# Patient Record
Sex: FEMALE | Race: BLACK OR AFRICAN AMERICAN | Employment: OTHER | URBAN - METROPOLITAN AREA
[De-identification: names, ages, dates, MRNs, and addresses within clinical notes are randomized per-mention and may not be internally consistent; named-entity substitution may affect disease eponyms.]

---

## 2022-11-27 ENCOUNTER — HOSPITAL ENCOUNTER (EMERGENCY)
Age: 67
Discharge: STILL ADMITTED OR ADMINISTRATIVE | End: 2022-11-27
Attending: STUDENT IN AN ORGANIZED HEALTH CARE EDUCATION/TRAINING PROGRAM

## 2022-11-27 ENCOUNTER — HOSPITAL ENCOUNTER (EMERGENCY)
Age: 67
Discharge: HOME | End: 2022-11-27
Attending: STUDENT IN AN ORGANIZED HEALTH CARE EDUCATION/TRAINING PROGRAM
Payer: MEDICARE

## 2022-11-27 VITALS
HEART RATE: 84 BPM | DIASTOLIC BLOOD PRESSURE: 78 MMHG | TEMPERATURE: 98.5 F | WEIGHT: 190 LBS | RESPIRATION RATE: 21 BRPM | OXYGEN SATURATION: 98 % | HEIGHT: 70 IN | SYSTOLIC BLOOD PRESSURE: 153 MMHG | BODY MASS INDEX: 27.2 KG/M2

## 2022-11-27 DIAGNOSIS — R07.89 ATYPICAL CHEST PAIN: ICD-10-CM

## 2022-11-27 DIAGNOSIS — I10 HYPERTENSION, UNSPECIFIED TYPE: Primary | ICD-10-CM

## 2022-11-27 DIAGNOSIS — Z53.21 PATIENT LEFT BEFORE EVALUATION BY PHYSICIAN: Primary | ICD-10-CM

## 2022-11-27 DIAGNOSIS — F41.1 ANXIETY STATE: ICD-10-CM

## 2022-11-27 LAB
ANION GAP SERPL CALC-SCNC: 4 MMOL/L (ref 3–18)
BASOPHILS # BLD: 0.1 K/UL (ref 0–0.1)
BASOPHILS NFR BLD: 1 % (ref 0–2)
BUN SERPL-MCNC: 8 MG/DL (ref 7–18)
BUN/CREAT SERPL: 9 (ref 12–20)
CALCIUM SERPL-MCNC: 9.9 MG/DL (ref 8.5–10.1)
CHLORIDE SERPL-SCNC: 104 MMOL/L (ref 100–111)
CO2 SERPL-SCNC: 30 MMOL/L (ref 21–32)
CREAT SERPL-MCNC: 0.87 MG/DL (ref 0.6–1.3)
DIFFERENTIAL METHOD BLD: NORMAL
EOSINOPHIL # BLD: 0.2 K/UL (ref 0–0.4)
EOSINOPHIL NFR BLD: 4 % (ref 0–5)
ERYTHROCYTE [DISTWIDTH] IN BLOOD BY AUTOMATED COUNT: 12.6 % (ref 11.6–14.5)
GLUCOSE SERPL-MCNC: 148 MG/DL (ref 74–99)
HCT VFR BLD AUTO: 40.8 % (ref 35–45)
HGB BLD-MCNC: 13.8 G/DL (ref 12–16)
IMM GRANULOCYTES # BLD AUTO: 0 K/UL (ref 0–0.04)
IMM GRANULOCYTES NFR BLD AUTO: 0 % (ref 0–0.5)
LYMPHOCYTES # BLD: 2.2 K/UL (ref 0.9–3.6)
LYMPHOCYTES NFR BLD: 37 % (ref 21–52)
MCH RBC QN AUTO: 31.3 PG (ref 24–34)
MCHC RBC AUTO-ENTMCNC: 33.8 G/DL (ref 31–37)
MCV RBC AUTO: 92.5 FL (ref 78–100)
MONOCYTES # BLD: 0.3 K/UL (ref 0.05–1.2)
MONOCYTES NFR BLD: 5 % (ref 3–10)
NEUTS SEG # BLD: 3 K/UL (ref 1.8–8)
NEUTS SEG NFR BLD: 52 % (ref 40–73)
NRBC # BLD: 0 K/UL (ref 0–0.01)
NRBC BLD-RTO: 0 PER 100 WBC
PLATELET # BLD AUTO: 204 K/UL (ref 135–420)
PMV BLD AUTO: 9.5 FL (ref 9.2–11.8)
POTASSIUM SERPL-SCNC: 3.7 MMOL/L (ref 3.5–5.5)
RBC # BLD AUTO: 4.41 M/UL (ref 4.2–5.3)
SODIUM SERPL-SCNC: 138 MMOL/L (ref 136–145)
TROPONIN-HIGH SENSITIVITY: 7 NG/L (ref 0–54)
TROPONIN-HIGH SENSITIVITY: 9 NG/L (ref 0–54)
WBC # BLD AUTO: 5.8 K/UL (ref 4.6–13.2)

## 2022-11-27 PROCEDURE — 84484 ASSAY OF TROPONIN QUANT: CPT

## 2022-11-27 PROCEDURE — 99284 EMERGENCY DEPT VISIT MOD MDM: CPT

## 2022-11-27 PROCEDURE — 85025 COMPLETE CBC W/AUTO DIFF WBC: CPT

## 2022-11-27 PROCEDURE — 80048 BASIC METABOLIC PNL TOTAL CA: CPT

## 2022-11-27 RX ORDER — HYDROCHLOROTHIAZIDE 50 MG/1
25 TABLET ORAL DAILY
COMMUNITY

## 2022-11-27 NOTE — ED PROVIDER NOTES
EMERGENCY DEPARTMENT HISTORY AND PHYSICAL EXAM    Date: 11/27/2022  Patient Name: Huber Loomis    History of Presenting Illness     Chief Complaint   Patient presents with    Hypertension     HPI:  Huber Loomis is a 78 y.o. female with a history of hypertension, presented due to concern for anxiety, stress, has been looking up items on [employer] who presents with complaints of atypical right arm pain, headache (now resolved), has been having anxiety for about 5 days. Had some sweating intermittently, underarm and forehead. She has been taking ibuprofen for aches. patient with essential hypertension, she was checking her blood pressure every 15 minutes or so for today and yesterday    Patient has been taking blood pressure medications    On review of systems, the patient denies fever, chills, rashes, headache, chest pain, anuria, oliguria. PCP: Sonido Shetty MD    Current Outpatient Medications   Medication Sig Dispense Refill    hydroCHLOROthiazide (HYDRODIURIL) 50 mg tablet Take 25 mg by mouth daily. LOSARTAN PO Take  by mouth. GLIPIZIDE PO Take  by mouth. IBUPROFEN PO Take  by mouth.      sitaGLIPtin-metFORMIN (JANUMET) 50-1,000 mg per tablet Take 1 Tab by mouth two (2) times daily (with meals). lisinopril (PRINIVIL, ZESTRIL) 20 mg tablet Take  by mouth daily.          Past History     Past Medical History:  Past Medical History:   Diagnosis Date    Diabetes ([address]     Endometrial cancer ([facility] [address]     H/O colonoscopy 6/2014    H/O mammogram 6/2014    Hypertension     Obesity        Past Surgical History:  Past Surgical History:   Procedure Laterality Date    HX SALPINGO-OOPHORECTOMY Bilateral     HX TOTAL LAPAROSCOPIC HYSTERECTOMY  4/26/11       Family History:  Family History   Problem Relation Age of Onset    Cancer Sister         breast    Cancer Brother         bone       Social History:  Social History     Tobacco Use    Smoking status: Never   Substance Use Topics Alcohol use: Yes     Alcohol/week: 0.8 standard drinks     Types: 1 Glasses of wine per week    Drug use: No       Allergies:  No Known Allergies    PMH, PSH, family history, social history, allergies reviewed with the patient with significant items noted above. Review of Systems   As per HPI, otherwise all systems reviewed and negative. Physical Exam     Vitals:    11/27/22 0900 11/27/22 0908 11/27/22 1041 11/27/22 1047   BP: (!) 167/65 (!) 154/73 (!) 151/78 (!) 153/78   Pulse: 72 84     Resp: 20 21     Temp: 98.5 °F (36.9 °C)      SpO2: 100% 99% 99% 98%   Weight: 86.2 kg (190 lb)      Height: 5' 10\" (1.778 m)          Gen: Well-appearing, in no acute distress   HEENT: Normocephalic, sclera anicteric  Cardiovascular: Normal rate, regular rhythm, no murmurs, rubs, gallops. Pulses intact and equal distally. Pulmonary: No respiratory distress. No stridor. Clear lungs. ABD: Soft, nontender, nondistended. Neuro: Alert. Normal speech. Normal mentation. Psych: Normal thought content and thought processes. : No CVA tenderness  EXT: Moves all extremities well. No cyanosis or clubbing. Skin: Warm and well-perfused. Diagnostic Study Results     Labs -     Recent Results (from the past 12 hour(s))   CBC WITH AUTOMATED DIFF    Collection Time: 11/27/22  9:02 AM   Result Value Ref Range    WBC 5.8 4.6 - 13.2 K/uL    RBC 4.41 4.20 - 5.30 M/uL    HGB 13.8 12.0 - 16.0 g/dL    HCT 40.8 35.0 - 45.0 %    MCV 92.5 78.0 - 100.0 FL    MCH 31.3 24.0 - 34.0 PG    MCHC 33.8 31.0 - 37.0 g/dL    RDW 12.6 11.6 - 14.5 %    PLATELET 898 862 - 049 K/uL    MPV 9.5 9.2 - 11.8 FL    NRBC 0.0 0  WBC    ABSOLUTE NRBC 0.00 0.00 - 0.01 K/uL    NEUTROPHILS 52 40 - 73 %    LYMPHOCYTES 37 21 - 52 %    MONOCYTES 5 3 - 10 %    EOSINOPHILS 4 0 - 5 %    BASOPHILS 1 0 - 2 %    IMMATURE GRANULOCYTES 0 0.0 - 0.5 %    ABS. NEUTROPHILS 3.0 1.8 - 8.0 K/UL    ABS. LYMPHOCYTES 2.2 0.9 - 3.6 K/UL    ABS.  MONOCYTES 0.3 0.05 - 1.2 K/UL ABS. EOSINOPHILS 0.2 0.0 - 0.4 K/UL    ABS. BASOPHILS 0.1 0.0 - 0.1 K/UL    ABS. IMM. GRANS. 0.0 0.00 - 0.04 K/UL    DF AUTOMATED     METABOLIC PANEL, BASIC    Collection Time: 11/27/22  9:02 AM   Result Value Ref Range    Sodium 138 136 - 145 mmol/L    Potassium 3.7 3.5 - 5.5 mmol/L    Chloride 104 100 - 111 mmol/L    CO2 30 21 - 32 mmol/L    Anion gap 4 3.0 - 18 mmol/L    Glucose 148 (H) 74 - 99 mg/dL    BUN 8 7.0 - 18 MG/DL    Creatinine 0.87 0.6 - 1.3 MG/DL    BUN/Creatinine ratio 9 (L) 12 - 20      eGFR >60 >60 ml/min/1.73m2    Calcium 9.9 8.5 - 10.1 MG/DL   TROPONIN-HIGH SENSITIVITY    Collection Time: 11/27/22  9:02 AM   Result Value Ref Range    Troponin-High Sensitivity 7 0 - 54 ng/L   TROPONIN-HIGH SENSITIVITY    Collection Time: 11/27/22 10:40 AM   Result Value Ref Range    Troponin-High Sensitivity 9 0 - 54 ng/L       Radiologic Studies -   No orders to display     CT Results  (Last 48 hours)      None          CXR Results  (Last 48 hours)      None              Medical Decision Making   I am the first provider for this patient. I reviewed the vital signs, available nursing notes, past medical history, past surgical history, family history and social history. Vital Signs-Reviewed the patient's vital signs. Records Reviewed: Personally, on initial evaluation    MDM:   Patient presents with asymptomatic hypertension. Exam significant for normal exam.  As per ACEP recommendations, no further intervention or testing will be performed at this time, However, after discussion, patient had atypical right arm pain, some sweating. There is no indication of hypertensive emergency or hypertensive urgency.     Patient condition on initial evaluation: Stable    Plan:     Orders as below:  Orders Placed This Encounter    CBC WITH AUTOMATED DIFF    BASIC METABOLIC PANEL    TROPONIN-HIGH SENSITIVITY    TROPONIN-HIGH SENSITIVITY    EKG, 12 LEAD, INITIAL    hydroCHLOROthiazide (HYDRODIURIL) 50 mg tablet LOSARTAN PO    GLIPIZIDE PO    IBUPROFEN PO        Considered but do not suspect ACS, PE, Pneumothorax, Aortic Dissection, Myocarditis, Pericarditis, Pneumonia, Boerhaave's syndrome, Esophageal Spasm, Esophageal Reflux, PUD, MSK CP, Costochondritis, Thoracic Radiculopathy, Shingles, Precordial Catch Syndrome, Rib Fracture     Well appearing, nontoxic, concerning though for chest pain in context of risk factors/cardiac history. EKG reassuring, enzymes negative, CXR unrevealing without PTX, new infiltrate or other acute process. Doubt dissection based on history, exam, and imaging, doubt PE as well in this patient, also not hypoxic or tachycardic. I think the most prudent course in this case would be ed observation with serial enzymes and EKGs    I discussed with this the patient who agrees. ED Course:   ED Course as of 11/27/22 1125   Sun Nov 27, 2022   0934 CBC without leukocytosis or anemia. [DM]   4089 Ambulated without difficulty. [DM]   6214 No acute pathology necessitating further emergent workup or hospital admission is suspected or found. Will discharge home with follow up. She is comfortable with the plan and discharge at this time. Expressed the importance of follow up for current symptoms and she agrees and was advised on what signs/symptoms to return immediately to the ER. [DM]      ED Course User Index  [DM] Ren Zapata MD      Based on their history, EKG (which showed no evidence of ischemia or infarction) and imaging, in addition to the patient's physical exam, I see no evidence at this time for a malignant etiology for the patient's chest pain. There is no acute evidence for pulmonary embolus, acute myocardial infarction, pneumothorax, Boerhaeve syndrome, cardiac tamponade, thoracic artery dissection, or any other emergent cardiac, pulmonary or aortic pathology.            Vitals Review/addressed -     Diagnostic Study Results     Orders Placed This Encounter CBC WITH AUTOMATED DIFF     Standing Status:   Standing     Number of Occurrences:   1    BASIC METABOLIC PANEL     Standing Status:   Standing     Number of Occurrences:   1    TROPONIN-HIGH SENSITIVITY     Standing Status:   Standing     Number of Occurrences:   1    TROPONIN-HIGH SENSITIVITY     Standing Status:   Standing     Number of Occurrences:   1    EKG, 12 LEAD, INITIAL     Standing Status:   Standing     Number of Occurrences:   1     Order Specific Question:   Reason for Exam:     Answer:   Pain    hydroCHLOROthiazide (HYDRODIURIL) 50 mg tablet     Sig: Take 25 mg by mouth daily. LOSARTAN PO     Sig: Take  by mouth. GLIPIZIDE PO     Sig: Take  by mouth. IBUPROFEN PO     Sig: Take  by mouth. Labs -     Recent Results (from the past 12 hour(s))   CBC WITH AUTOMATED DIFF    Collection Time: 11/27/22  9:02 AM   Result Value Ref Range    WBC 5.8 4.6 - 13.2 K/uL    RBC 4.41 4.20 - 5.30 M/uL    HGB 13.8 12.0 - 16.0 g/dL    HCT 40.8 35.0 - 45.0 %    MCV 92.5 78.0 - 100.0 FL    MCH 31.3 24.0 - 34.0 PG    MCHC 33.8 31.0 - 37.0 g/dL    RDW 12.6 11.6 - 14.5 %    PLATELET 275 471 - 994 K/uL    MPV 9.5 9.2 - 11.8 FL    NRBC 0.0 0  WBC    ABSOLUTE NRBC 0.00 0.00 - 0.01 K/uL    NEUTROPHILS 52 40 - 73 %    LYMPHOCYTES 37 21 - 52 %    MONOCYTES 5 3 - 10 %    EOSINOPHILS 4 0 - 5 %    BASOPHILS 1 0 - 2 %    IMMATURE GRANULOCYTES 0 0.0 - 0.5 %    ABS. NEUTROPHILS 3.0 1.8 - 8.0 K/UL    ABS. LYMPHOCYTES 2.2 0.9 - 3.6 K/UL    ABS. MONOCYTES 0.3 0.05 - 1.2 K/UL    ABS. EOSINOPHILS 0.2 0.0 - 0.4 K/UL    ABS. BASOPHILS 0.1 0.0 - 0.1 K/UL    ABS. IMM.  GRANS. 0.0 0.00 - 0.04 K/UL    DF AUTOMATED     METABOLIC PANEL, BASIC    Collection Time: 11/27/22  9:02 AM   Result Value Ref Range    Sodium 138 136 - 145 mmol/L    Potassium 3.7 3.5 - 5.5 mmol/L    Chloride 104 100 - 111 mmol/L    CO2 30 21 - 32 mmol/L    Anion gap 4 3.0 - 18 mmol/L    Glucose 148 (H) 74 - 99 mg/dL    BUN 8 7.0 - 18 MG/DL    Creatinine 0. 87 0.6 - 1.3 MG/DL    BUN/Creatinine ratio 9 (L) 12 - 20      eGFR >60 >60 ml/min/1.73m2    Calcium 9.9 8.5 - 10.1 MG/DL   TROPONIN-HIGH SENSITIVITY    Collection Time: 11/27/22  9:02 AM   Result Value Ref Range    Troponin-High Sensitivity 7 0 - 54 ng/L   TROPONIN-HIGH SENSITIVITY    Collection Time: 11/27/22 10:40 AM   Result Value Ref Range    Troponin-High Sensitivity 9 0 - 54 ng/L       Radiologic Studies -   No orders to display     CT Results  (Last 48 hours)      None          CXR Results  (Last 48 hours)      None            Disposition     Disposition:  Home    CLINICAL IMPRESSION:    1. Hypertension, unspecified type    2. Anxiety state    3. Atypical chest pain        It should be noted that I will be the provider of record for this patient  Swetha Moffett MD    Follow-up Information       Follow up With Specialties Details Why [address]    THE [facility] EMERGENCY DEPT Emergency Medicine Go to  If symptoms worsen [address] [facility] [address] [facility] [address], [facility] [phone]              Current Discharge Medication List          Please note that this dictation was completed with [employer], the computer voice recognition software. Quite often unanticipated grammatical, syntax, homophones, and other interpretive errors are inadvertently transcribed by the computer software. Please disregard these errors. Please excuse any errors that have escaped final proofreading.

## 2022-11-27 NOTE — DISCHARGE INSTRUCTIONS
Please carefully read all discharge instructions  Please follow up with your primary care doctor in 1-2 days    Please follow-up with a primary care physician and if you do not have one currently use the contact information provided to obtain an appointment. If none was provided please call the number on the back of your insurance card to locate a Primary care doctor. Many offices have \"cancellation lists\" that you can ask to be placed on; should a patient with an earlier appointment cancel you will be notified to take their place. Please return to the Emergency Room immediately if your symptoms worsen. Please return to the Emergency Department if you develop a fever, chills, cannot eat or drink due to nausea or vomiting, or if any of your symptoms worsen. If you do not have insurance you can use the below for your medications. [employer][email or web address]    What are GoodRx coupons? GoodRx coupons will help you pay less than the cash price for your prescription. [employer] Pill free to use and are accepted at virtually every [place] pharmacy. Your pharmacist will know how to enter the codes on the coupon to pull up the lowest discount available. [employer] Activation    Thank you for requesting access to [employer]. Please follow the instructions below to securely access and download your online medical record. [employer] allows you to send messages to your doctor, view your test results, renew your prescriptions, schedule appointments, and more. How Do I Sign Up? In your internet browser, go to [email or web address]  Click on the First Time User? Click Here link in the Sign In box. You will be redirect to the New Member Sign Up page. Enter your [employer] Access Code exactly as it appears below. You will not need to use this code after youve completed the sign-up process. If you do not sign up before the expiration date, you must request a new code.     [employer] Access Code: [email or web address]  Expires: 1/11/2023 9:02 AM    Enter the last four digits of your Social Security Number (xxxx) and Date of Birth (mm/dd/yyyy) as indicated and click Submit. You will be taken to the next sign-up page. Create a [employer] ID. This will be your [employer] login ID and cannot be changed, so think of one that is secure and easy to remember. Create a [employer] password. You can change your password at any time. Enter your Password Reset Question and Answer. This can be used at a later time if you forget your password. Enter your e-mail address. You will receive e-mail notification when new information is available in [address] Click Sign Up. You can now view and download portions of your medical record. Click the [employer] link to download a portable copy of your medical information. Additional Information    If you have questions, please visit the Frequently Asked Questions section of the [employer] website at [email or web address] Remember, [employer] is NOT to be used for urgent needs. For medical emergencies, dial 911.

## 2022-11-28 LAB
ATRIAL RATE: 68 BPM
CALCULATED P AXIS, ECG09: 60 DEGREES
CALCULATED R AXIS, ECG10: -25 DEGREES
CALCULATED T AXIS, ECG11: 50 DEGREES
DIAGNOSIS, 93000: NORMAL
P-R INTERVAL, ECG05: 132 MS
Q-T INTERVAL, ECG07: 394 MS
QRS DURATION, ECG06: 84 MS
QTC CALCULATION (BEZET), ECG08: 418 MS
VENTRICULAR RATE, ECG03: 68 BPM

## 2023-01-04 ENCOUNTER — HOSPITAL ENCOUNTER (EMERGENCY)
Age: 68
Discharge: HOME | End: 2023-01-04
Attending: EMERGENCY MEDICINE
Payer: MEDICARE

## 2023-01-04 VITALS
RESPIRATION RATE: 16 BRPM | SYSTOLIC BLOOD PRESSURE: 132 MMHG | WEIGHT: 182 LBS | OXYGEN SATURATION: 100 % | TEMPERATURE: 98 F | HEIGHT: 69 IN | DIASTOLIC BLOOD PRESSURE: 62 MMHG | BODY MASS INDEX: 26.96 KG/M2 | HEART RATE: 83 BPM

## 2023-01-04 DIAGNOSIS — S61.215A LACERATION OF LEFT RING FINGER WITHOUT FOREIGN BODY WITHOUT DAMAGE TO NAIL, INITIAL ENCOUNTER: Primary | ICD-10-CM

## 2023-01-04 PROCEDURE — 75810000293 HC SIMP/SUPERF WND  RPR

## 2023-01-04 PROCEDURE — 99283 EMERGENCY DEPT VISIT LOW MDM: CPT

## 2023-01-04 RX ORDER — CEPHALEXIN 500 MG/1
1000 CAPSULE ORAL 2 TIMES DAILY
Qty: 40 CAPSULE | Refills: 0 | Status: SHIPPED | OUTPATIENT
Start: 2023-01-04 | End: 2023-01-14

## 2023-01-05 NOTE — ED PROVIDER NOTES
EMERGENCY DEPARTMENT HISTORY AND PHYSICAL EXAM    Date: 1/4/2023  Patient Name: Kailey Frances    History of Presenting Illness     Chief Complaint   Patient presents with    Laceration         History Provided By: Patient    Additional History (Context): aKiley Frances is a 78 y.o. female with diabetes and hypertension who presents with complaint of left hand finger, ring finger, laceration which she cut on a piece of glass earlier today while washing dishes. Denies any anticoagulation. Says she is not able to control the bleeding. Denies numbness or weakness. PCP: Dread Elena MD    Current Outpatient Medications   Medication Sig Dispense Refill    cephALEXin (Keflex) 500 mg capsule Take 2 Capsules by mouth two (2) times a day for 10 days. 40 Capsule 0    hydroCHLOROthiazide (HYDRODIURIL) 50 mg tablet Take 25 mg by mouth daily. LOSARTAN PO Take  by mouth. GLIPIZIDE PO Take  by mouth. IBUPROFEN PO Take  by mouth.      sitaGLIPtin-metFORMIN (JANUMET) 50-1,000 mg per tablet Take 1 Tab by mouth two (2) times daily (with meals). lisinopril (PRINIVIL, ZESTRIL) 20 mg tablet Take  by mouth daily. Past History     Past Medical History:  Past Medical History:   Diagnosis Date    Diabetes ([address]     Endometrial cancer ([address]     H/O colonoscopy 6/2014    H/O mammogram 6/2014    Hypertension     Obesity        Past Surgical History:  Past Surgical History:   Procedure Laterality Date    HX SALPINGO-OOPHORECTOMY Bilateral     HX TOTAL LAPAROSCOPIC HYSTERECTOMY  4/26/11       Family History:  Family History   Problem Relation Age of Onset    Cancer Sister         breast    Cancer Brother         bone       Social History:  Social History     Tobacco Use    Smoking status: Never   Substance Use Topics    Alcohol use:  Yes     Alcohol/week: 0.8 standard drinks     Types: 1 Glasses of wine per week    Drug use: No       Allergies:  No Known Allergies      Review of Systems   Review of Systems   Skin:  Positive for wound. Neurological:  Negative for weakness and numbness. All Other Systems Negative  Physical Exam     Vitals:    01/04/23 1754   BP: 132/62   Pulse: 83   Resp: 16   Temp: 98 °F (36.7 °C)   SpO2: 100%   Weight: 82.6 kg (182 lb)   Height: 5' 9\" (1.753 m)     Physical Exam  Vitals and nursing note reviewed. Constitutional:       Appearance: She is well-developed. HENT:      Head: Normocephalic and atraumatic. Right Ear: External ear normal.      Left Ear: External ear normal.      Nose: Nose normal.   Eyes:      Conjunctiva/sclera: Conjunctivae normal.      Pupils: Pupils are equal, round, and reactive to light. Neck:      Vascular: No JVD. Trachea: No tracheal deviation. Cardiovascular:      Rate and Rhythm: Normal rate and regular rhythm. Heart sounds: Normal heart sounds. No murmur heard. No friction rub. No gallop. Pulmonary:      Effort: Pulmonary effort is normal. No respiratory distress. Breath sounds: Normal breath sounds. No wheezing or rales. Abdominal:      General: Bowel sounds are normal. There is no distension. Palpations: Abdomen is soft. There is no mass. Tenderness: There is no abdominal tenderness. There is no guarding or rebound. Musculoskeletal:         General: No tenderness. Normal range of motion. Cervical back: Normal range of motion and neck supple. Skin:     General: Skin is warm and dry. Findings: Lesion present. No rash. Comments: On the medial aspect across the DIP joint there is a 1 cm laceration shallow not tender but minimally bleeding. Cap refill less than 2 seconds. Sensation intact throughout. Neurological:      Mental Status: She is alert and oriented to person, place, and time. Cranial Nerves: No cranial nerve deficit. Deep Tendon Reflexes: Reflexes are normal and symmetric.    Psychiatric:         Behavior: Behavior normal.            Diagnostic Study Results     Labs -   No results found for this or any previous visit (from the past 12 hour(s)). Radiologic Studies -   No orders to display     CT Results  (Last 48 hours)      None          CXR Results  (Last 48 hours)      None              Medical Decision Making   I am the first provider for this patient. I reviewed the vital signs, available nursing notes, past medical history, past surgical history, family history and social history. Vital Signs-Reviewed the patient's vital signs. Records Reviewed: Nursing Notes    Procedures:  Wound Repair    Date/Time: 1/4/2023 8:09 PM  Performed by: [address] [place] [address] provider: Eran Almeida  Preparation: skin prepped with Apolonia-Last  Pre-procedure re-eval: Immediately prior to the procedure, the patient was reevaluated and found suitable for the planned procedure and any planned medications. Time out: Immediately prior to the procedure a time out was called to verify the correct patient, procedure, equipment, staff and marking as appropriate. .  Location details: left ring finger  Wound length:2.5 cm or less  Anesthesia: local infiltration    Anesthesia:  Local Anesthetic: lidocaine 1% without epinephrine  Anesthetic total: 1 mL  Foreign bodies: no foreign bodies  Irrigation solution: saline  Irrigation method: syringe  Skin closure: 5-0 nylon and Prolene  Number of sutures: 2  Technique: simple and interrupted  Approximation: loose  Dressing: 4x4 and splint  Patient tolerance: patient tolerated the procedure well with no immediate complications  My total time at bedside, performing this procedure was 1-15 minutes. Provider Notes (Medical Decision Making): Left ring finger laceration repaired and splinted. Tetanus is up-to-date. Because she is a diabetic we will also send prescription for Keflex. Splint applied by nurse to left ring finger; excellent position. N/v intact before and after application.       MED RECONCILIATION:  No current facility-administered medications for this encounter. Current Outpatient Medications   Medication Sig    cephALEXin (Keflex) 500 mg capsule Take 2 Capsules by mouth two (2) times a day for 10 days. hydroCHLOROthiazide (HYDRODIURIL) 50 mg tablet Take 25 mg by mouth daily. LOSARTAN PO Take  by mouth. GLIPIZIDE PO Take  by mouth. IBUPROFEN PO Take  by mouth.    sitaGLIPtin-metFORMIN (JANUMET) 50-1,000 mg per tablet Take 1 Tab by mouth two (2) times daily (with meals). lisinopril (PRINIVIL, ZESTRIL) 20 mg tablet Take  by mouth daily. Disposition:  home    DISCHARGE NOTE:   8:10 PM    Pt has been reexamined. Patient has no new complaints, changes, or physical findings. Care plan outlined and precautions discussed. Results of exam were reviewed with the patient. All medications were reviewed with the patient; will d/c home with keflex. All of pt's questions and concerns were addressed. Patient was instructed and agrees to follow up with PCP, as well as to return to the ED upon further deterioration. Patient is ready to go home. Follow-up Information       Follow up With Specialties Details Why Marifer Bowen MD Family Medicine In 2 weeks For suture removal, For wound re-check [address] [facility]      THE [facility] EMERGENCY DEPT Emergency Medicine  If symptoms worsen return immediately [address] [facility] [address]  [phone]            Current Discharge Medication List        START taking these medications    Details   cephALEXin (Keflex) 500 mg capsule Take 2 Capsules by mouth two (2) times a day for 10 days. Qty: 40 Capsule, Refills: 0  Start date: 1/4/2023, End date: 1/14/2023             Diagnosis     Clinical Impression:   1.  Laceration of left ring finger without foreign body without damage to nail, initial encounter

## 2023-02-02 ENCOUNTER — HOSPITAL ENCOUNTER (EMERGENCY)
Age: 68
Discharge: HOME | End: 2023-02-02
Attending: EMERGENCY MEDICINE
Payer: MEDICARE

## 2023-02-02 VITALS
TEMPERATURE: 97.5 F | HEART RATE: 68 BPM | RESPIRATION RATE: 18 BRPM | DIASTOLIC BLOOD PRESSURE: 62 MMHG | SYSTOLIC BLOOD PRESSURE: 128 MMHG | OXYGEN SATURATION: 100 %

## 2023-02-02 DIAGNOSIS — Z48.02 VISIT FOR SUTURE REMOVAL: Primary | ICD-10-CM

## 2023-02-02 PROCEDURE — 99282 EMERGENCY DEPT VISIT SF MDM: CPT

## 2023-02-02 PROCEDURE — 75810000275 HC EMERGENCY DEPT VISIT NO LEVEL OF CARE

## 2023-02-02 NOTE — ED PROVIDER NOTES
THE [facility] EMERGENCY DEPT  EMERGENCY DEPARTMENT ENCOUNTER       Pt Name: Ayanna Moctezuma  MRN: [id number]  [place] 1955  Date of evaluation: 2/2/2023  Provider: JAMES West   PCP: Ramandeep Mcdonald MD  Note Started: 2:51 PM 2/2/23     CHIEF COMPLAINT       Chief Complaint   Patient presents with    Suture Removal        HISTORY OF PRESENT ILLNESS: 1 or more elements      History From: Patient  HPI Limitations : None     Theodoria A Chase Diggs is a 80 y.o. female who presents to ED c/o suture removal.  Patient states she had 2 sutures placed to her left fourth finger after she cut it on a piece of glass. She has been busy with other things and has not been able to return but the skin is starting to become irritated and dry prompting her to come today. She denies new injury, trauma, swelling, redness or drainage. Nursing Notes were all reviewed and agreed with or any disagreements were addressed in the HPI. REVIEW OF SYSTEMS     Positives and Pertinent negatives as per HPI. PAST HISTORY     Past Medical History:  Past Medical History:   Diagnosis Date    Diabetes ([facility] [address]     Endometrial cancer ([facility] [address]     H/O colonoscopy 6/2014    H/O mammogram 6/2014    Hypertension     Obesity        Past Surgical History:  Past Surgical History:   Procedure Laterality Date    HX SALPINGO-OOPHORECTOMY Bilateral     HX TOTAL LAPAROSCOPIC HYSTERECTOMY  4/26/11       Family History:  Family History   Problem Relation Age of Onset    Cancer Sister         breast    Cancer Brother         bone       Social History:  Social History     Tobacco Use    Smoking status: Never   Substance Use Topics    Alcohol use: Yes     Alcohol/week: 0.8 standard drinks     Types: 1 Glasses of wine per week    Drug use: No       Allergies:  No Known Allergies    CURRENT MEDICATIONS      Previous Medications    GLIPIZIDE PO    Take  by mouth. HYDROCHLOROTHIAZIDE (HYDRODIURIL) 50 MG TABLET    Take 25 mg by mouth daily.     IBUPROFEN PO Take  by mouth. LISINOPRIL (PRINIVIL, ZESTRIL) 20 MG TABLET    Take  by mouth daily. LOSARTAN PO    Take  by mouth. SITAGLIPTIN-METFORMIN (JANUMET) 50-1,000 MG PER TABLET    Take 1 Tab by mouth two (2) times daily (with meals). SCREENINGS               No data recorded         PHYSICAL EXAM      ED Triage Vitals [02/02/23 1448]   ED Encounter Vitals Group      /62      Pulse (Heart Rate) 68      Resp Rate 18      Temp 97.5 °F (36.4 °C)      Temp src       O2 Sat (%) 100 %      Weight       Height         Physical Exam  Vitals and nursing note reviewed. Constitutional:       General: She is not in acute distress. Appearance: Normal appearance. She is normal weight. HENT:      Head: Normocephalic and atraumatic. Musculoskeletal:        Hands:    Skin:     General: Skin is warm and dry. Neurological:      General: No focal deficit present. Mental Status: She is alert and oriented to person, place, and time. Psychiatric:         Mood and Affect: Mood normal.         Behavior: Behavior normal.          DIAGNOSTIC RESULTS   LABS:     No results found for this or any previous visit (from the past 12 hour(s)). No results found.       PROCEDURES   Unless otherwise noted below, none  Suture/Staple Removal    Date/Time: 2/2/2023 2:54 PM  Performed by: JAMES Chavira  Authorized by: JAMES Chavira     Consent:     Consent obtained:  Verbal    Consent given by:  Patient    Risks, benefits, and alternatives were discussed: yes      Risks discussed:  Bleeding and pain    Alternatives discussed:  No treatment  Universal protocol:     Procedure explained and questions answered to patient or proxy's satisfaction: yes      Patient identity confirmed:  Verbally with patient  Location:     Location:  Upper extremity    Upper extremity location:  Hand    Hand location:  L ring finger  Procedure details:     Wound appearance:  No signs of infection, good wound healing and clean Number of sutures removed:  2  Post-procedure details:     Post-removal:  No dressing applied    Procedure completion:  Tolerated well, no immediate complications     CRITICAL CARE TIME   none    EMERGENCY DEPARTMENT COURSE and DIFFERENTIAL DIAGNOSIS/MDM   Vitals:    Vitals:    02/02/23 1448   BP: 128/62   Pulse: 68   Resp: 18   Temp: 97.5 °F (36.4 °C)   SpO2: 100%        Patient was given the following medications:  Medications - No data to display    CONSULTS: (Who and What was discussed)  None    Social Determinants affecting Dx or Tx: None    Records Reviewed (source and summary): None    ED Course       Medical Decision Making/Disposition Considerations: 27-year-old female presents for suture removal 1 month after the sutures were placed to the left fourth finger laceration. No signs of infection but skin is slightly callused now. She is neurovascular intact with no motor deficits. 2 sutures were removed without complication patient tolerated well. Proper wound care, PCP follow-up as needed discussed. FINAL IMPRESSION     1. Visit for suture removal          DISPOSITION/PLAN   Discharged    Discharge Note: The patient is stable for discharge home. The signs, symptoms, diagnosis, and discharge instructions have been discussed, understanding conveyed, and agreed upon. The patient is to follow up as recommended or return to ER should their symptoms worsen. PATIENT REFERRED TO:  Follow-up Information       Follow up With Specialties Details Why Contact Jamal Lucas MD Family Medicine  As needed [address] [place] [address] [address] [facility] [address] [facility]      THE [facility] EMERGENCY DEPT Emergency Medicine  As needed, If symptoms worsen [address] [place] [id number]  [phone]              DISCHARGE MEDICATIONS:  Current Discharge Medication List            DISCONTINUED MEDICATIONS:  Current Discharge Medication List              I am the Primary Clinician of Record.        (Please note that parts of this dictation were completed with voice recognition software. Quite often unanticipated grammatical, syntax, homophones, and other interpretive errors are inadvertently transcribed by the computer software. Please disregards these errors.  Please excuse any errors that have escaped final proofreading.)

## 2023-02-25 ENCOUNTER — HOSPITAL ENCOUNTER (EMERGENCY)
Facility: HOSPITAL | Age: 68
Discharge: HOME | End: 2023-02-25
Attending: STUDENT IN AN ORGANIZED HEALTH CARE EDUCATION/TRAINING PROGRAM
Payer: MEDICARE

## 2023-02-25 ENCOUNTER — APPOINTMENT (OUTPATIENT)
Facility: HOSPITAL | Age: 68
End: 2023-02-25
Payer: MEDICARE

## 2023-02-25 VITALS
OXYGEN SATURATION: 100 % | SYSTOLIC BLOOD PRESSURE: 151 MMHG | RESPIRATION RATE: 18 BRPM | BODY MASS INDEX: 25.48 KG/M2 | TEMPERATURE: 97.1 F | HEART RATE: 72 BPM | DIASTOLIC BLOOD PRESSURE: 72 MMHG | HEIGHT: 69 IN | WEIGHT: 172 LBS

## 2023-02-25 DIAGNOSIS — M79.605 LEG PAIN, LATERAL, LEFT: Primary | ICD-10-CM

## 2023-02-25 PROCEDURE — 6370000000 HC RX 637 (ALT 250 FOR IP): Performed by: PHYSICIAN ASSISTANT

## 2023-02-25 PROCEDURE — 73552 X-RAY EXAM OF FEMUR 2/>: CPT

## 2023-02-25 PROCEDURE — 99283 EMERGENCY DEPT VISIT LOW MDM: CPT

## 2023-02-25 RX ORDER — ACETAMINOPHEN 500 MG
1000 TABLET ORAL
Status: COMPLETED | OUTPATIENT
Start: 2023-02-25 | End: 2023-02-25

## 2023-02-25 RX ORDER — CYCLOBENZAPRINE HCL 10 MG
10 TABLET ORAL 3 TIMES DAILY PRN
Qty: 21 TABLET | Refills: 0 | Status: SHIPPED | OUTPATIENT
Start: 2023-02-25 | End: 2023-03-07

## 2023-02-25 RX ADMIN — ACETAMINOPHEN 1000 MG: 500 TABLET ORAL at 10:22

## 2023-02-25 ASSESSMENT — PAIN SCALES - GENERAL: PAINLEVEL_OUTOF10: 10

## 2023-02-25 ASSESSMENT — PAIN DESCRIPTION - ORIENTATION: ORIENTATION: LEFT

## 2023-02-25 ASSESSMENT — PAIN - FUNCTIONAL ASSESSMENT: PAIN_FUNCTIONAL_ASSESSMENT: 0-10

## 2023-02-25 ASSESSMENT — PAIN DESCRIPTION - DESCRIPTORS: DESCRIPTORS: ACHING

## 2023-02-25 ASSESSMENT — PAIN DESCRIPTION - LOCATION: LOCATION: LEG

## 2023-02-25 NOTE — ED PROVIDER NOTES
EMERGENCY DEPARTMENT HISTORY & PHYSICAL EXAM    THE [facility] EMERGENCY DEPT  2/25/2023, 9:35 AM EST    Clinical Impression:  1. Leg pain, lateral, left        Assessment/Differential Diagnosis:     Ddx pain, fracture, hip injury, muscle strain, sciatica, back injury all considered    ED Course:   Initial assessment performed. The patients presenting problems have been discussed, and they are in agreement with the care plan formulated and outlined with them. I have encouraged them to ask questions as they arise throughout their visit. Pt here with left lateral thigh pain after lifting furniture 3 days ago. Continual pain, lateral thigh, with no aggravating/alleviating factors. No direct trauma. No previous pain. No fever, abd pain, urinary complaints. Pt able to ambulate with discomfort, able to drive herself here. Taking motrin without relief. Exam with pt tearful with history. Vitals with no acute concern. Pt able to stand, walk in room, bend, remove jeans without obvious discomfort. Exam with FROM at hip, knee, ankle, foot without pain. + pain with palpaiton lateral thigh, no lesion, skin appears normal. Neg SLR. NO pain over lumbar spine. Tylenol given  Xray left femur neg  Discussed results with pt. Will treat with motrin, tylenol and flexeril with early follow up with PCP next week. Return precautions discussed        Medical Chart Review:  I have reviewed triage nursing documentation. Review of old medical records with the following pertinent information:       Disposition:  Home  in good condition. No chief complaint on file. HPI:    The history is provided by patient. No  used. Ole Wallace is a 80 y.o. female presenting to the Emergency Department with complaints of left leg pain. Patient states she lives alone. About 3 days ago she was moving some furniture.   While doing that she had pain along the lateral aspect of her left thigh.  She states the pain has continued today which brought her to the ER for evaluation. [place] any direct injury or fall. No previous similar pain. She denies back pain, buttocks pain, leg weakness, saddle anesthesia or incontinence. She states the pain is there and is constant. No alleviating or aggravating factors. She states she is able to get up and walk around. She was able to drive herself to the ER today. She has been taking Motrin with little relief. Patient does have history of hypertension and diabetes for which she takes medications. She denies anticoagulation      I have reviewed all PMHX, FMHX and Social Hx as entered into the medical record in the chart below using the Epic Template. Review of Systems:  Constitutional: neg for fever, chills  ENT:  neg for URI symptoms  Respiratory:  neg for cough, shortness of breath  Cardiovascular:  neg for chest pain  GI:  neg for abdominal pain. :  No Flank pain. MSK: neg for direct trauma  Integumentary: no rashes, or skin trauma  Neurological: neg for headaches  All other systems reviewed negative with exception of positives in ROS and HPI. Past Medical History:  Past Medical History:   Diagnosis Date    Diabetes ([facility] [address]     Endometrial cancer ([facility] [address]     H/O colonoscopy 6/2014    H/O mammogram 6/2014    Hypertension     Obesity        Past Surgical History:  Past Surgical History:   Procedure Laterality Date    HYSTERECTOMY (CERVIX STATUS UNKNOWN)  4/26/11    SALPINGO-OOPHORECTOMY Bilateral        Family History:  Family History   Problem Relation Age of Onset    Cancer Brother         bone    Cancer Sister         breast       Social History:  Social History     Tobacco Use    Smoking status: Never   Substance Use Topics    Alcohol use:  Yes     Alcohol/week: 0.8 standard drinks    Drug use: No       Allergies:  No Known Allergies    Vital Signs:  Vitals:    02/25/23 0924   BP: (!) 151/72   Pulse: 72   Resp: 18   Temp: 97.1 °F (36.2 °C) TempSrc: Temporal   SpO2: 100%   Weight: 172 lb (78 kg)   Height: 5' 9\" (1.753 m)     Physical Exam:  Vital Signs Reviewed. Nursing Notes Reviewed. Constitutional:  Well developed, well nourished patient. Appearance and behavior are age and situation appropriate. Head: Normocephalic, Atraumatic  Eyes: Conjunctiva clear, lids normal. Sclera anicteric. ENT:hearing grossly intact  Neck:  supple, FROM  Lungs: No respiratory distress. Lungs CTAB   CV:  RR&R without murmur  Extremities:  LLE with FROM hip, knee, ankle foot without increased pain. Ambulating in room easily. No antalgic gait. +tenderness with palpation lateral thigh with skin appearing normal, No mass, no bony defect. Neuro:  A&O x 3. CN II-XII grossly intact. No gross neuro deficits. Skin:  Warm, dry, no rash. Skin intact. Spine:  No tenderness to palpation over the cervical, thoracic or lumbar spine. No bony defect on my exam. No swelling. No SLR. Diagnostics:    Labs -   No results found for this or any previous visit (from the past 12 hour(s)). Radiologic Studies -   XR FEMUR LEFT (MIN 2 VIEWS)   Final Result   No fracture seen          Medications given in the ED-  Medications   acetaminophen (TYLENOL) tablet 1,000 mg (1,000 mg Oral Given 2/25/23 1022)       Please note that this dictation was completed with [email or web address], the computer voice recognition software. Quite often unanticipated grammatical, syntax, homophones, and other interpretive errors are inadvertently transcribed by the computer software. Please disregard these errors. Please excuse any errors that have escaped final proofreading.        Bill Moreno PA-C  02/25/23 [id number]

## 2023-02-25 NOTE — ED TRIAGE NOTES
Pt report left leg pain X 2 days. Pt report that she was moving heavy furniture prior to the onset of pain. Pt verbalized,\" I may have pulled my hamstring\". Nurse observe antalgic gait.
own all

## 2023-02-25 NOTE — DISCHARGE INSTRUCTIONS
Well-hydrated  Healthy diet  Continue all your home medications  Tylenol, 500 mg, 2 every 6-8 hours  Motrin, 200 mg, 2-3 every 8 hours with food  Ice to area of pain, 20 minutes, 3-4 times a day  Activity as tolerated  Flexeril as prescribed  Follow-up with your doctor next week for further evaluation and treatment as needed  Return to ER if new or worsening symptoms or new concerns